# Patient Record
Sex: FEMALE | Race: BLACK OR AFRICAN AMERICAN | Employment: STUDENT | URBAN - METROPOLITAN AREA
[De-identification: names, ages, dates, MRNs, and addresses within clinical notes are randomized per-mention and may not be internally consistent; named-entity substitution may affect disease eponyms.]

---
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
## 2023-12-02 NOTE — PROGRESS NOTES
OUTPATIENT PSYCHOTHERAPY PROGRESS NOTE     Client Name: Lynn Arechiga   YOB: 2005 (15 year old)     Date of Service:  November 12, 2020  Time of Service: 3:07pm to 4:00pm (53 minutes)  Service Type(s):67498 psychotherapy (53-60 min. with patient and/or family)  Type of service: Telemedicine Psychotherapy  Reason for Telemedicine Visit: COVID-19 public health recommendations on in-person sessions  Mode of transmission: Secure real time interactive audio and visual telecommunication system (videoconference via Zoom)  Location of originating and distant sites:  ? Originating site (patient location): patient home  ? Distant site (provider site): HIPAA compliant location within provider home/remote setting  Telemedicine Visit: The patient's condition can be safely assessed and treated via synchronous audio and visual telemedicine encounter.    Patient has agreed to receiving services via telemedicine technology.     Diagnoses:        Encounter Diagnoses   Name Primary?     Posttraumatic Stress Disorder Yes     Depression, moderate, unspecified recurrence           Individuals Present:   Client attended alone     Treatment goal(s) being addressed:   Treatment plan is still under review.       Subjective:  Lynn reported that she was feeling better about school and the end of the trimester. She doesn't have a lot of missing work so she feels confident that she can get everything in and increase her grades. Lynn's birthday activities didn't go as planned and some friends didn't attend which upset Lynn. She reported that she has taken a step back from those relationships but she is appreciative of the people who did show up.      Treatment:   Engaged in individual trauma focused cognitive behavioral therapy.  Engaged in psychoeducation on trauma and the body's fight, flight, and freeze response Discussed Lynn's physiological responses to stress. Provided psychoeducation on relaxation techniques (e.g., deep  breathing, progressive muscled relaxation, and guided imagery). Practiced each of the relaxation techniques together.  Provided validation and praise. Engaged in reflective listening.      Assessment and Progress:   Lynn's affect was appropriate to the topic of conversation and she was engaged throughout the session. Lynn appeared to have more motivation than her previous session evidenced by her creating a two year plan as well as a daily routine plan. Lynn reported that she is trying to follow her routine each day and she would rate her self a 6.5 out of 10 for sticking to it. Lynn endorsed the following physiological responses to stress: increased heart rate, nausea, feeling like her stomach drops (e.g., that sensation when you ride a rollercoaster). Lynn preferred the guided imagery relaxation strategy, but also noted that deep breathing would be helpful when she doesn't have a lot of time.  There are no safety concerns at this time.      Plan:   Next therapy appointment has been scheduled for November 19, 2020 at 3pm. Will send Lynn a resource list of relaxation techniques.      Treatment Plan review due: under development        Elva Bazan MA, MA    Practicum Student    Attestation Statement: I did not see this patient directly, but have discussed the session with the above trainee and approve this documentation.     Clare Verdugo, PhD, LP    Clinical Supervisor    Oriented - self; Oriented - place; Oriented - time